# Patient Record
Sex: MALE | Race: BLACK OR AFRICAN AMERICAN | Employment: UNEMPLOYED | URBAN - METROPOLITAN AREA
[De-identification: names, ages, dates, MRNs, and addresses within clinical notes are randomized per-mention and may not be internally consistent; named-entity substitution may affect disease eponyms.]

---

## 2017-12-19 ENCOUNTER — HOSPITAL ENCOUNTER (OUTPATIENT)
Dept: GENERAL RADIOLOGY | Age: 32
Discharge: HOME | End: 2017-12-19
Payer: MEDICARE

## 2017-12-19 DIAGNOSIS — R52 PAIN: ICD-10-CM

## 2017-12-19 PROCEDURE — 73521 X-RAY EXAM HIPS BI 2 VIEWS: CPT

## 2017-12-19 PROCEDURE — 72100 X-RAY EXAM L-S SPINE 2/3 VWS: CPT

## 2017-12-27 ENCOUNTER — HOSPITAL ENCOUNTER (OUTPATIENT)
Dept: PHYSICAL THERAPY | Age: 32
Discharge: HOME | End: 2017-12-27
Payer: MEDICARE

## 2017-12-27 PROCEDURE — G8979 MOBILITY GOAL STATUS: HCPCS

## 2017-12-27 PROCEDURE — G8978 MOBILITY CURRENT STATUS: HCPCS

## 2017-12-27 PROCEDURE — 97110 THERAPEUTIC EXERCISES: CPT

## 2017-12-27 PROCEDURE — 97161 PT EVAL LOW COMPLEX 20 MIN: CPT
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
## 2018-01-02 ENCOUNTER — HOSPITAL ENCOUNTER (OUTPATIENT)
Dept: PHYSICAL THERAPY | Age: 33
Discharge: HOME | End: 2018-01-02
Payer: MEDICARE

## 2018-01-02 PROCEDURE — 97140 MANUAL THERAPY 1/> REGIONS: CPT | Performed by: PHYSICAL THERAPIST

## 2018-01-02 NOTE — PROGRESS NOTES
[facility]  [address], [address] [facility]    OUTPATIENT physical Therapy DAILY Treatment NOTe  Visit: 2    NAME: Alex Duarte AGE: 33 y.o. GENDER: male  DATE: 1/2/2018  REFERRING PHYSICIAN: Keturah Leavitt NP      GOALS  Short term goals  Time frame: 3 weeks  1. Patient will be compliant and independent with the initial HEP as evidenced by being able to perform without cueing. 2. Patient will report a 50% improvement in symptoms. 3. Patient report a 50% improvement in sleeping. 4. Patient will tolerate 15 minutes of clinic activities before onset of symptoms.      Long term goals  Time frame: 6 weeks  1. Patient will report pain level decrease to 2/10 to allow increased ease of movement. 2. Patient will have an improved score on the TXU Janki outcome measure by 10 points to demonstrate an increase in functional activity tolerance. 3. Patient will be independent in final individualized HEP. 6. Patient will return to School without being limited by symptoms. 7. Patient will sleep 6-8 hours without being interrupted by pain. SUBJECTIVE:   \"It's like this sharp, shooting pain down my leg. \"  Pain In: 10/10 in left hip/thigh/back/buttocks    OBJECTIVE DATA SUMMARY:   Objective data from initial evaluation:   EXAMINATION/PRESENTATION/DECISION MAKING:   Pain:  Location: Left hip and lat thigh and calf  Quality: aching, sharp and tingling  Now:  9/10  Factors that improve pain: rest, medication: Naprosyn used and beneficial     Posture:   Standing, Pronounced shift to right     Strength:   Left LE  Knee extension 4+/5  Knee Flexion 4/5     Range of Motion:   Trunk  Flexion, 25% of normal  Left SB 10%, painful  Right SB 25%   Extension 10%      Spinal Assessment:  Prominent shift to right     Joint Mobility:   Pt tolerated prone, Lumbar segments slightly hypomobile wih P-A pressure, pain L3-S1     Palpation:   TTP Left Gluts and piriformis,   Mild TTP along left lateral thigh     Neurologic Assessment:                         Tone: normal                         Sensation: WNL                         Reflexes: WNL, patellar reflex, slightly hyperactive on left     Special Tests:   + slump test B/L'ly  + SLR, B/L     Mobility:                         Transitional Movements: antalgic                          Gait: Antalgic with shift to right     Balance: WNL     Functional Measure:   Amando Marty:11/24     TREATMENT/INTERVENTION:  Modalities (Rationale): MHP post treatment to decrease pain and muscle guarding -held this date     Therapeutic Exercises:  HEP provided on evaluation: LTR, KTC, PPT, Angry Cat, Child's pose, prone on elbows    Self release to left piriformis with tennis ball: 30 seconds x 6  Slump neural glides LLE: 10 reps    Manual Therapy:  Long and short axis distraction LLE; pain relief  MET correct  Trigger point release to left piriformis, gluteals  Sciatic nerve glides LLE; supine  Piriformis stretch: 3 reps with 30 second holds  Lumbar gapping; right sidelying     Neuro Re-Education:  Discussed sitting with low back support    Activity tolerance and post treatment pain report:   Fair  Pain Out: 9/10    Education:  Education was provided to the patient on the following topics  [x]    No changes were made to the home exercise program.  []    The following changes were made to the home exercise program  Patient verbalized understanding of the topics presented. ASSESSMENT:   Patient returns following initial evaluation. Patient reports good adherence to HEP as able. Patient demonstrates continued pain in left low back, thigh, buttocks, hip which radiating to anterior tibialis. Patient describes pain as sharp and shooting this date. Patient very tender to palpation at left piriformis and gluteals. Appears to be some irritation from sciatic nerve. Patient with significant shift to right in standing; will require further evaluation next session.  Patient instructed on self neural glides in sitting and self release to left piriformis with tennis ball; instructed to avoid prolonged hold with tennis ball (30 seconds on single trigger point). Patient with pain relief following long and short axis distraction. Patients progression toward goals is as follows:  [x]     Improving appropriately and progressing toward goals  []     Improving slowly and progressing toward goals  []     Not making progress toward goals and plan of care will be adjusted    PLAN OF CARE:   Patient continues to benefit from skilled intervention to address the above impairments. [x]    Continue treatment per established plan of care.   []     Recommend the following changes to the plan of care    Recommendations/Intent for next treatment: further evaluate patient's shift to right     Jeovany Meyer, PT   Time Calculation: 30 mins  Patient Time in clinic:   Start Time: 1300   Stop Time: 1330

## 2018-01-04 ENCOUNTER — HOSPITAL ENCOUNTER (OUTPATIENT)
Dept: PHYSICAL THERAPY | Age: 33
Discharge: HOME | End: 2018-01-04
Payer: MEDICARE

## 2018-01-04 ENCOUNTER — APPOINTMENT (OUTPATIENT)
Dept: PHYSICAL THERAPY | Age: 33
End: 2018-01-04
Payer: MEDICARE

## 2018-01-04 PROCEDURE — 97140 MANUAL THERAPY 1/> REGIONS: CPT | Performed by: PHYSICAL THERAPIST

## 2018-01-04 PROCEDURE — G8980 MOBILITY D/C STATUS: HCPCS | Performed by: PHYSICAL THERAPIST

## 2018-01-04 PROCEDURE — G8979 MOBILITY GOAL STATUS: HCPCS | Performed by: PHYSICAL THERAPIST

## 2018-01-04 PROCEDURE — 97110 THERAPEUTIC EXERCISES: CPT | Performed by: PHYSICAL THERAPIST

## 2018-01-04 NOTE — PROGRESS NOTES
[facility]  [address], [address] [facility]    OUTPATIENT physical Therapy DAILY Treatment NOTe  Visit: 3    NAME: Michelle Lund AGE: 33 y.o. GENDER: male  DATE: 1/4/2018  REFERRING PHYSICIAN: Lori Joyce NP      GOALS  Short term goals  Time frame: 3 weeks  1. Patient will be compliant and independent with the initial HEP as evidenced by being able to perform without cueing. 2. Patient will report a 50% improvement in symptoms. 3. Patient report a 50% improvement in sleeping. 4. Patient will tolerate 15 minutes of clinic activities before onset of symptoms.      Long term goals  Time frame: 6 weeks  1. Patient will report pain level decrease to 2/10 to allow increased ease of movement. 2. Patient will have an improved score on the TXU Janki outcome measure by 10 points to demonstrate an increase in functional activity tolerance. 3. Patient will be independent in final individualized HEP. 6. Patient will return to School without being limited by symptoms. 7. Patient will sleep 6-8 hours without being interrupted by pain. SUBJECTIVE:   \"I'm able to walk better and stand up straight. \"    Pain In: 6/10 in left hip/thigh/back/buttocks    OBJECTIVE DATA SUMMARY:   Objective data from initial evaluation:   EXAMINATION/PRESENTATION/DECISION MAKING:   Pain:  Location: Left hip and lat thigh and calf  Quality: aching, sharp and tingling  Now:  9/10  Factors that improve pain: rest, medication: Naprosyn used and beneficial     Posture:   Standing, Pronounced shift to right     Strength:   Left LE  Knee extension 4+/5  Knee Flexion 4/5     Range of Motion:   Trunk  Flexion, 25% of normal  Left SB 10%, painful  Right SB 25%   Extension 10%      Spinal Assessment:  Prominent shift to right     Joint Mobility:   Pt tolerated prone, Lumbar segments slightly hypomobile wih P-A pressure, pain L3-S1     Palpation:   TTP Left Gluts and piriformis,   Mild TTP along left lateral thigh     Neurologic Assessment:                         Tone: normal                         Sensation: WNL                         Reflexes: WNL, patellar reflex, slightly hyperactive on left     Special Tests:   + slump test B/L'ly  + SLR, B/L     Mobility:                         Transitional Movements: antalgic                          Gait: Antalgic with shift to right     Balance: WNL     Functional Measure:   Amando Marty:11/24     TREATMENT/INTERVENTION:  Modalities (Rationale): MHP post treatment to decrease pain and muscle guarding -held this date     Therapeutic Exercises:  HEP provided on evaluation: LTR, KTC, PPT, Angry Cat, Child's pose, prone on elbows    Self release to left piriformis with tennis ball: 30 seconds x 6  Slump neural glides LLE: 10 reps  PPT: 5 reps  Bridges: 5 reps    Manual Therapy:  Long and short axis distraction LLE; pain relief  MET correct for left anterior innominate-held, equal  Trigger point release to left piriformis, gluteals  PA mob lumbar spine in prone, grade 2  Sacral PA mob in prone, grade 4  Sciatic nerve glides LLE; supine  Piriformis stretch: 3 reps with 30 second holds  Lumbar gapping in right sidelying; pain relief     Neuro Re-Education:  Discussed sitting with low back support    Activity tolerance and post treatment pain report:   Fair  Pain Out: 6/10    Education:  Education was provided to the patient on the following topics  [x]    No changes were made to the home exercise program.  []    The following changes were made to the home exercise program  Patient verbalized understanding of the topics presented. ASSESSMENT:   Patient reports good adherence to HEP. Patient demonstrates decreased pain since last session. Patient reports that he is walking better with less pain. Pain is still present in left low back, thigh, buttocks, and hip which radiates to anterior tibialis.  Patient less tender to palpation at left piriformis and gluteals this date. Good response to sciatic nerve glides and long and short axis distraction. Patient with significant shift to right in standing; no true leg length discrepancy noted. 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 Patient provided with heel insert for significant observable leg length difference to see if it will provide relief from some of the pain. Reassess response to treatment next session. Patients progression toward goals is as follows:  [x]     Improving appropriately and progressing toward goals  []     Improving slowly and progressing toward goals  []     Not making progress toward goals and plan of care will be adjusted    PLAN OF CARE:   Patient continues to benefit from skilled intervention to address the above impairments. [x]    Continue treatment per established plan of care.   []     Recommend the following changes to the plan of care    Recommendations/Intent for next treatment: reassess response to manual therapy    Namita Magaña, PT   Time Calculation: 60 mins  Patient Time in clinic:   Start Time: 1300   Stop Time: 1400

## 2018-01-09 ENCOUNTER — HOSPITAL ENCOUNTER (OUTPATIENT)
Dept: PHYSICAL THERAPY | Age: 33
Discharge: HOME | End: 2018-01-09
Payer: MEDICARE

## 2018-01-09 NOTE — PROGRESS NOTES
[facility]  [address], [address] [facility]    OUTPATIENT physical Therapy      1/9/2018:  Jose Currie was not seen on this date for physical therapy for the following reasons:    [x]     Patient called to cancel the visit for the following reasons: weather  []     Patient missed the visit and did not call to cancel.     Stephen Zabala, PT

## 2018-03-13 NOTE — PROGRESS NOTES
[facility]  [address], [address] [facility]    OUTPATIENT physical Therapy discharge note      3/13/2018:  Patient will be discharged from physical therapy at this time. Criteria for termination of care:    []           Patient has plateaued  [x]           Patient has not returned to therapy  []           Patient has missed three or more visits without prior notification  []           Other    Patient was seen for 3 visits from 12/27/17 to 1/4/18. Please refer to the most recent progress note for the latest PT info available. If you need anything further faxed to you, please contact us at [phone].     Thank you for this referral.  Madonna Hinton, PT

## 2018-03-13 NOTE — PROGRESS NOTES
Late Entry Physical Therapy Note  For discharge completed on 1/4/18    At the time of discharge G-codes were not filed. This was due to unanticipated discharge as patient did not return to therapy. The therapist who completed the last progress note was Tom Finley. The objective data available and documentation present has been reviewed. The case was discussed with the evaluating therapist, where available. The G-code and impairment levels have been determined based on therapist discretion because an objective measure was not available. In compliance with CMS's Claims Based Outcome Reporting, the following G-code set was chosen for this patient based on the primary functional limitation being treated. The patient's impairment level was determined based on the following:      ?  Mobility - Walking and Moving Around:     - CURRENT STATUS: CK - 40%-59% impaired, limited or restricted    - GOAL STATUS: CI - 1%-19% impaired, limited or restricted    - D/C STATUS:  CK - 40%-59% impaired, limited or restricted     Tom Finley, PT

## 2018-03-13 NOTE — PROGRESS NOTES
Late Entry Physical Therapy Note  For discharge completed on 1/4/18    At the time of discharge G-codes were not filed. This was due to unanticipated discharge as patient did not return to therapy. The therapist who completed the last progress note was Hang Tang. The objective data available and documentation present has been reviewed. The case was discussed with the evaluating therapist, where available. The G-code and impairment levels have been determined based on therapist discretion because an objective measure was not available. In compliance with CMS's Claims Based Outcome Reporting, the following G-code set was chosen for this patient based on the primary functional limitation being treated. The patient's impairment level was determined based on the following:      ?  Mobility - Walking and Moving Around:     - CURRENT STATUS: CK - 40%-59% impaired, limited or restricted    - GOAL STATUS: CI - 1%-19% impaired, limited or restricted    - D/C STATUS:  CK - 40%-59% impaired, limited or restricted     Dirk Running, PT

## 2018-03-13 NOTE — PROGRESS NOTES
[facility]  [address], [address] [facility]    OUTPATIENT physical Therapy discharge note      3/13/2018:  Patient will be discharged from physical therapy at this time. Criteria for termination of care:    []           Patient has plateaued  [x]           Patient has not returned to therapy  []           Patient has missed three or more visits without prior notification  []           Other    Patient was seen for 3 visits from 12/27/17 to 1/4/18. Please refer to the most recent progress note for the latest PT info available. If you need anything further faxed to you, please contact us at [phone].     Thank you for this referral.  Lobo Salinas, PT

## 2020-08-18 ENCOUNTER — HOSPITAL ENCOUNTER (OUTPATIENT)
Dept: GENERAL RADIOLOGY | Age: 35
Discharge: HOME | End: 2020-08-18
Payer: MEDICARE

## 2020-08-18 DIAGNOSIS — M54.2 NECK PAIN: ICD-10-CM

## 2020-08-18 PROCEDURE — 72050 X-RAY EXAM NECK SPINE 4/5VWS: CPT

## 2020-09-12 ENCOUNTER — HOSPITAL ENCOUNTER (EMERGENCY)
Age: 35
Discharge: HOME | End: 2020-09-12
Attending: EMERGENCY MEDICINE
Payer: MEDICARE

## 2020-09-12 VITALS
SYSTOLIC BLOOD PRESSURE: 108 MMHG | RESPIRATION RATE: 16 BRPM | OXYGEN SATURATION: 100 % | HEIGHT: 73 IN | WEIGHT: 167.55 LBS | BODY MASS INDEX: 22.21 KG/M2 | DIASTOLIC BLOOD PRESSURE: 76 MMHG | HEART RATE: 108 BPM | TEMPERATURE: 99.8 F

## 2020-09-12 DIAGNOSIS — R10.84 ABDOMINAL PAIN, GENERALIZED: Primary | ICD-10-CM

## 2020-09-12 DIAGNOSIS — R19.7 DIARRHEA, UNSPECIFIED TYPE: ICD-10-CM

## 2020-09-12 DIAGNOSIS — R11.2 NAUSEA AND VOMITING, INTRACTABILITY OF VOMITING NOT SPECIFIED, UNSPECIFIED VOMITING TYPE: ICD-10-CM

## 2020-09-12 LAB
ALBUMIN SERPL-MCNC: 4.3 G/DL (ref 3.5–5)
ALBUMIN/GLOB SERPL: 1.2 {RATIO} (ref 1.1–2.2)
ALP SERPL-CCNC: 54 U/L (ref 45–117)
ALT SERPL-CCNC: 64 U/L (ref 12–78)
ANION GAP SERPL CALC-SCNC: 4 MMOL/L (ref 5–15)
APPEARANCE UR: CLEAR
AST SERPL-CCNC: 48 U/L (ref 15–37)
BACTERIA URNS QL MICRO: NEGATIVE /HPF
BASOPHILS # BLD: 0 K/UL (ref 0–0.1)
BASOPHILS NFR BLD: 0 % (ref 0–1)
BILIRUB SERPL-MCNC: 0.9 MG/DL (ref 0.2–1)
BILIRUB UR QL CFM: NEGATIVE
BUN SERPL-MCNC: 9 MG/DL (ref 6–20)
BUN/CREAT SERPL: 7 (ref 12–20)
CALCIUM SERPL-MCNC: 9.5 MG/DL (ref 8.5–10.1)
CHLORIDE SERPL-SCNC: 104 MMOL/L (ref 97–108)
CO2 SERPL-SCNC: 30 MMOL/L (ref 21–32)
COLOR UR: ABNORMAL
CREAT SERPL-MCNC: 1.21 MG/DL (ref 0.7–1.3)
DIFFERENTIAL METHOD BLD: ABNORMAL
EOSINOPHIL # BLD: 0 K/UL (ref 0–0.4)
EOSINOPHIL NFR BLD: 0 % (ref 0–7)
EPITH CASTS URNS QL MICRO: ABNORMAL /LPF
ERYTHROCYTE [DISTWIDTH] IN BLOOD BY AUTOMATED COUNT: 13.2 % (ref 11.5–14.5)
GLOBULIN SER CALC-MCNC: 3.5 G/DL (ref 2–4)
GLUCOSE SERPL-MCNC: 84 MG/DL (ref 65–100)
GLUCOSE UR STRIP.AUTO-MCNC: NEGATIVE MG/DL
HCT VFR BLD AUTO: 44.6 % (ref 36.6–50.3)
HGB BLD-MCNC: 15.5 G/DL (ref 12.1–17)
HGB UR QL STRIP: NEGATIVE
IMM GRANULOCYTES # BLD AUTO: 0 K/UL (ref 0–0.04)
IMM GRANULOCYTES NFR BLD AUTO: 0 % (ref 0–0.5)
KETONES UR QL STRIP.AUTO: 40 MG/DL
LEUKOCYTE ESTERASE UR QL STRIP.AUTO: ABNORMAL
LIPASE SERPL-CCNC: 64 U/L (ref 73–393)
LYMPHOCYTES # BLD: 1.7 K/UL (ref 0.8–3.5)
LYMPHOCYTES NFR BLD: 34 % (ref 12–49)
MAGNESIUM SERPL-MCNC: 2.2 MG/DL (ref 1.6–2.4)
MCH RBC QN AUTO: 34.3 PG (ref 26–34)
MCHC RBC AUTO-ENTMCNC: 34.8 G/DL (ref 30–36.5)
MCV RBC AUTO: 98.7 FL (ref 80–99)
MONOCYTES # BLD: 1.1 K/UL (ref 0–1)
MONOCYTES NFR BLD: 22 % (ref 5–13)
MUCOUS THREADS URNS QL MICRO: ABNORMAL /LPF
NEUTS SEG # BLD: 2.3 K/UL (ref 1.8–8)
NEUTS SEG NFR BLD: 44 % (ref 32–75)
NITRITE UR QL STRIP.AUTO: NEGATIVE
NRBC # BLD: 0 K/UL (ref 0–0.01)
NRBC BLD-RTO: 0 PER 100 WBC
PH UR STRIP: 6.5 [PH] (ref 5–8)
PLATELET # BLD AUTO: 164 K/UL (ref 150–400)
PMV BLD AUTO: 9.6 FL (ref 8.9–12.9)
POTASSIUM SERPL-SCNC: 3.5 MMOL/L (ref 3.5–5.1)
PROT SERPL-MCNC: 7.8 G/DL (ref 6.4–8.2)
PROT UR STRIP-MCNC: 30 MG/DL
RBC # BLD AUTO: 4.52 M/UL (ref 4.1–5.7)
RBC #/AREA URNS HPF: ABNORMAL /HPF (ref 0–5)
RBC MORPH BLD: ABNORMAL
SODIUM SERPL-SCNC: 138 MMOL/L (ref 136–145)
SP GR UR REFRACTOMETRY: 1.02 (ref 1–1.03)
UA: UC IF INDICATED,UAUC: ABNORMAL
UROBILINOGEN UR QL STRIP.AUTO: 1 EU/DL (ref 0.2–1)
WBC # BLD AUTO: 5.1 K/UL (ref 4.1–11.1)
WBC URNS QL MICRO: ABNORMAL /HPF (ref 0–4)

## 2020-09-12 PROCEDURE — 99283 EMERGENCY DEPT VISIT LOW MDM: CPT

## 2020-09-12 PROCEDURE — 80053 COMPREHEN METABOLIC PANEL: CPT

## 2020-09-12 PROCEDURE — 85025 COMPLETE CBC W/AUTO DIFF WBC: CPT

## 2020-09-12 PROCEDURE — 87635 SARS-COV-2 COVID-19 AMP PRB: CPT

## 2020-09-12 PROCEDURE — 81001 URINALYSIS AUTO W/SCOPE: CPT

## 2020-09-12 PROCEDURE — 96372 THER/PROPH/DIAG INJ SC/IM: CPT

## 2020-09-12 PROCEDURE — 36415 COLL VENOUS BLD VENIPUNCTURE: CPT

## 2020-09-12 PROCEDURE — 83690 ASSAY OF LIPASE: CPT

## 2020-09-12 PROCEDURE — 74011250636 HC RX REV CODE- 250/636: Performed by: EMERGENCY MEDICINE

## 2020-09-12 PROCEDURE — 87491 CHLMYD TRACH DNA AMP PROBE: CPT

## 2020-09-12 PROCEDURE — 83735 ASSAY OF MAGNESIUM: CPT

## 2020-09-12 PROCEDURE — 74011250637 HC RX REV CODE- 250/637: Performed by: EMERGENCY MEDICINE

## 2020-09-12 PROCEDURE — 74011000250 HC RX REV CODE- 250: Performed by: EMERGENCY MEDICINE

## 2020-09-12 RX ORDER — ONDANSETRON 4 MG/1
4 TABLET, ORALLY DISINTEGRATING ORAL
Status: COMPLETED | OUTPATIENT
Start: 2020-09-12 | End: 2020-09-12

## 2020-09-12 RX ORDER — AZITHROMYCIN 250 MG/1
500 TABLET, FILM COATED ORAL
Status: COMPLETED | OUTPATIENT
Start: 2020-09-12 | End: 2020-09-12

## 2020-09-12 RX ORDER — ONDANSETRON 4 MG/1
4 TABLET, ORALLY DISINTEGRATING ORAL
Qty: 10 TAB | Refills: 0 | Status: SHIPPED | OUTPATIENT
Start: 2020-09-12

## 2020-09-12 RX ADMIN — LIDOCAINE HYDROCHLORIDE 250 MG: 10 INJECTION, SOLUTION EPIDURAL; INFILTRATION; INTRACAUDAL; PERINEURAL at 15:22

## 2020-09-12 RX ADMIN — ONDANSETRON 4 MG: 4 TABLET, ORALLY DISINTEGRATING ORAL at 15:23

## 2020-09-12 RX ADMIN — AZITHROMYCIN 500 MG: 250 TABLET, FILM COATED ORAL at 15:22

## 2020-09-12 NOTE — DISCHARGE INSTRUCTIONS
Patient Education        Diarrhea: Care Instructions  Your Care Instructions     Diarrhea is loose, watery stools (bowel movements). The exact cause is often hard to find. Sometimes diarrhea is your body's way of getting rid of what caused an upset stomach. Viruses, food poisoning, and many medicines can cause diarrhea. Some people get diarrhea in response to emotional stress, anxiety, or certain foods. Almost everyone has diarrhea now and then. It usually isn't serious, and your stools will return to normal soon. The important thing to do is replace the fluids you have lost, so you can prevent dehydration. The doctor has checked you carefully, but problems can develop later. If you notice any problems or new symptoms, get medical treatment right away. Follow-up care is a key part of your treatment and safety. Be sure to make and go to all appointments, and call your doctor if you are having problems. It's also a good idea to know your test results and keep a list of the medicines you take. How can you care for yourself at home? · Watch for signs of dehydration, which means your body has lost too much water. Dehydration is a serious condition and should be treated right away. Signs of dehydration are:  ? Increasing thirst and dry eyes and mouth. ? Feeling faint or lightheaded. ? A smaller amount of urine than normal.  · To prevent dehydration, drink plenty of fluids. Choose water and other caffeine-free clear liquids until you feel better. If you have kidney, heart, or liver disease and have to limit fluids, talk with your doctor before you increase the amount of fluids you drink. · Begin eating small amounts of mild foods the next day, if you feel like it. ? Try yogurt that has live cultures of Lactobacillus. (Check the label.)  ? Avoid spicy foods, fruits, alcohol, and caffeine until 48 hours after all symptoms are gone. ? Avoid chewing gum that contains sorbitol. ?  Avoid dairy products (except for yogurt with Lactobacillus) while you have diarrhea and for 3 days after symptoms are gone. · The doctor may recommend that you take over-the-counter medicine, such as loperamide (Imodium), if you still have diarrhea after 6 hours. Read and follow all instructions on the label. Do not use this medicine if you have bloody diarrhea, a high fever, or other signs of serious illness. Call your doctor if you think you are having a problem with your medicine. When should you call for help? Call 911 anytime you think you may need emergency care. For example, call if:    · You passed out (lost consciousness).     · Your stools are maroon or very bloody. Call your doctor now or seek immediate medical care if:    · You are dizzy or lightheaded, or you feel like you may faint.     · Your stools are black and look like tar, or they have streaks of blood.     · You have new or worse belly pain.     · You have symptoms of dehydration, such as:  ? Dry eyes and a dry mouth. ? Passing only a little dark urine. ? Feeling thirstier than usual.     · You have a new or higher fever. Watch closely for changes in your health, and be sure to contact your doctor if:    · Your diarrhea is getting worse.     · You see pus in the diarrhea.     · You are not getting better after 2 days (48 hours). Where can you learn more? Go to http://tl-efe.info/  Enter [id number] in the search box to learn more about \"Diarrhea: Care Instructions. \"  Current as of: June 26, 2019               Content Version: 12.6  © 5771-1791 [employer], Incorporated. Care instructions adapted under license by [employer] (which disclaims liability or warranty for this information). If you have questions about a medical condition or this instruction, always ask your healthcare professional. [place] [address] any warranty or liability for your use of this information.          Patient Education        Abdominal Pain: Care Instructions  Your Care Instructions     Abdominal pain has many possible causes. Some aren't serious and get better on their own in a few days. Others need more testing and treatment. If your pain continues or gets worse, you need to be rechecked and may need more tests to find out what is wrong. You may need surgery to correct the problem. Don't ignore new symptoms, such as fever, nausea and vomiting, urination problems, pain that gets worse, and dizziness. These may be signs of a more serious problem. Your doctor may have recommended a follow-up visit in the next 8 to 12 hours. If you are not getting better, you may need more tests or treatment. The doctor has checked you carefully, but problems can develop later. If you notice any problems or new symptoms, get medical treatment right away. Follow-up care is a key part of your treatment and safety. Be sure to make and go to all appointments, and call your doctor if you are having problems. It's also a good idea to know your test results and keep a list of the medicines you take. How can you care for yourself at home? · Rest until you feel better. · To prevent dehydration, drink plenty of fluids, enough so that your urine is light yellow or clear like water. Choose water and other caffeine-free clear liquids until you feel better. If you have kidney, heart, or liver disease and have to limit fluids, talk with your doctor before you increase the amount of fluids you drink. · If your stomach is upset, eat mild foods, such as rice, dry toast or crackers, bananas, and applesauce. Try eating several small meals instead of two or three large ones. · Wait until 48 hours after all symptoms have gone away before you have spicy foods, alcohol, and drinks that contain caffeine. · Do not eat foods that are high in fat. · Avoid anti-inflammatory medicines such as aspirin, ibuprofen (Advil, Motrin), and naproxen (Aleve). These can cause stomach upset.  Talk to your doctor if you take daily aspirin for another health problem. When should you call for help? Call 911 anytime you think you may need emergency care. For example, call if:    · You passed out (lost consciousness).     · You pass maroon or very bloody stools.     · You vomit blood or what looks like coffee grounds.     · You have new, severe belly pain. Call your doctor now or seek immediate medical care if:    · Your pain gets worse, especially if it becomes focused in one area of your belly.     · You have a new or higher fever.     · Your stools are black and look like tar, or they have streaks of blood.     · You have unexpected vaginal bleeding.     · You have symptoms of a urinary tract infection. These may include:  ? Pain when you urinate. ? Urinating more often than usual.  ? Blood in your urine.     · You are dizzy or lightheaded, or you feel like you may faint. Watch closely for changes in your health, and be sure to contact your doctor if:    · You are not getting better after 1 day (24 hours). Where can you learn more? Go to http://tlEncirq Corporationefe.info/  Enter Z246 in the search box to learn more about \"Abdominal Pain: Care Instructions. \"  Current as of: June 26, 2019               Content Version: 12.6  © 7421-2717 [employer]. Care instructions adapted under license by [employer] (which disclaims liability or warranty for this information). If you have questions about a medical condition or this instruction, always ask your healthcare professional. [place] [address] any warranty or liability for your use of this information. Patient Education        Nausea and Vomiting: Care Instructions  Your Care Instructions     When you are nauseated, you may feel weak and sweaty and notice a lot of saliva in your mouth. Nausea often leads to vomiting.  Most of the time you do not need to worry about nausea and vomiting, but they can be signs of other illnesses. Two common causes of nausea and vomiting are stomach flu and food poisoning. Nausea and vomiting from viral stomach flu will usually start to improve within 24 hours. Nausea and vomiting from food poisoning may last from 12 to 48 hours. The doctor has checked you carefully, but problems can develop later. If you notice any problems or new symptoms, get medical treatment right away. Follow-up care is a key part of your treatment and safety. Be sure to make and go to all appointments, and call your doctor if you are having problems. It's also a good idea to know your test results and keep a list of the medicines you take. How can you care for yourself at home? · To prevent dehydration, drink plenty of fluids, enough so that your urine is light yellow or clear like water. Choose water and other caffeine-free clear liquids until you feel better. If you have kidney, heart, or liver disease and have to limit fluids, talk with your doctor before you increase the amount of fluids you drink. · Rest in bed until you feel better. · When you are able to eat, try clear soups, mild foods, and liquids until all symptoms are gone for 12 to 48 hours. Other good choices include dry toast, crackers, cooked cereal, and gelatin dessert, such as Jell-O. When should you call for help? Call 911 anytime you think you may need emergency care. For example, call if:    · You passed out (lost consciousness). Call your doctor now or seek immediate medical care if:    · You have symptoms of dehydration, such as:  ? Dry eyes and a dry mouth. ? Passing only a little dark urine. ? Feeling thirstier than usual.     · You have new or worsening belly pain.     · You have a new or higher fever.     · You vomit blood or what looks like coffee grounds. Watch closely for changes in your health, and be sure to contact your doctor if:    · You have ongoing nausea and vomiting.     · Your vomiting is getting worse.   · Your vomiting lasts longer than 2 days.     · You are not getting better as expected. Where can you learn more? Go to http://www.Emergency CallWorks.com/  Enter H591 in the search box to learn more about \"Nausea and Vomiting: Care Instructions. \"  Current as of: June 26, 2019               Content Version: 12.6  © 1037-6274 [employer]. Care instructions adapted under license by [employer] (which disclaims liability or warranty for this information). If you have questions about a medical condition or this instruction, always ask your healthcare professional. [place] [address] any warranty or liability for your use of this information.

## 2020-09-12 NOTE — ED PROVIDER NOTES
EMERGENCY DEPARTMENT HISTORY AND PHYSICAL EXAM      Date: 9/12/2020  Patient Name: Collin Calixto    History of Presenting Illness     Chief Complaint   Patient presents with    Nausea    Vomiting    Diarrhea     times four days    Sexually Transmitted Disease     requesting STD check       History Provided By: Patient    HPI: Collin Calixto, 30 y.o. male with PMHx as noted below presents the emergency department with complaints of nausea, vomiting, diarrhea as well as requesting STD check. Patient notes that he was recently in [place] where he was sexually active with no protection was concerned about STIs. Patient also reports developing a, nausea, vomiting, diarrhea approximately 4 days ago after returning from [place]. Patient notes the symptoms have persisted and are associated with diffuse, dull crampy abdominal pain that is intermittent and does not radiate. No relieving or exacerbating factors. Patient reports loose, nonbloody stool and there is been no blood in the emesis. Patient does report history of HIV but notes that he had labs done 1 month ago with an undetectable viral load and normal CD4 count. Patient denies any recent foreign travel, hospitalization, antibiotic usage. PCP: Kraig Young NP    Current Facility-Administered Medications   Medication Dose Route Frequency Provider Last Rate Last Dose    ondansetron (ZOFRAN ODT) tablet 4 mg  4 mg Oral NOW Jun Stovall MD        cefTRIAXone (ROCEPHIN) 250 mg in lidocaine (PF) (XYLOCAINE) 10 mg/mL (1 %) IM injection  250 mg IntraMUSCular Mo Felton MD        [facility]) tablet 500 mg  500 mg Oral NOW Jun Stovall MD         Current Outpatient Medications   Medication Sig Dispense Refill    ondansetron (Zofran ODT) 4 mg disintegrating tablet Take 1 Tab by mouth every eight (8) hours as needed for Nausea.  10 Tab 0       Past History     Past Medical History:  HIV      Social History:  Denies heavy alcohol or illicit drugs      Allergies:  No Known Allergies      Review of Systems   Review of Systems  Constitutional: Negative for fever, chills, and fatigue. HENT: Negative for congestion, sore throat, rhinorrhea, sneezing and neck stiffness   Eyes: Negative for discharge and redness. Respiratory: Negative for  shortness of breath, wheezing   Cardiovascular: Negative for chest pain, palpitations   Gastrointestinal: Positive for nausea, vomiting, abdominal pain, , diarrhea. Negative blood in stool. Genitourinary: Negative for dysuria, hematuria, flank pain, decreased urine volume, discharge,   Musculoskeletal: Negative for myalgias or joint pain . Skin: Negative for rash or lesions . Neurological: Negative weakness, light-headedness, numbness and headaches. Physical Exam   Physical Exam    GENERAL: alert and oriented, no acute distress  EYES: PEERL, No injection, discharge or icterus. ENT: Mucous membranes pink and moist.  NECK: Supple  LUNGS: Airway patent. Non-labored respirations. Breath sounds clear with good air entry bilaterally. HEART: Regular rate and rhythm. No peripheral edema  ABDOMEN: Non-distended and non-tender, without guarding or rebound.   SKIN:  warm, dry  MSK/EXTREMITIES: Without swelling, tenderness or deformity, symmetric with normal ROM  NEUROLOGICAL: Alert, oriented      Diagnostic Study Results     Labs -     Recent Results (from the past 12 hour(s))   CBC WITH AUTOMATED DIFF    Collection Time: 09/12/20  2:31 PM   Result Value Ref Range    WBC 5.1 4.1 - 11.1 K/uL    RBC 4.52 4.10 - 5.70 M/uL    HGB 15.5 12.1 - 17.0 g/dL    HCT 44.6 36.6 - 50.3 %    MCV 98.7 80.0 - 99.0 FL    MCH 34.3 (H) 26.0 - 34.0 PG    MCHC 34.8 30.0 - 36.5 g/dL    RDW 13.2 11.5 - 14.5 %    PLATELET 189 161 - 713 K/uL    MPV 9.6 8.9 - 12.9 FL    NRBC 0.0 0  WBC    ABSOLUTE NRBC 0.00 0.00 - 0.01 K/uL    NEUTROPHILS PENDING %    LYMPHOCYTES PENDING %    MONOCYTES PENDING %    EOSINOPHILS PENDING %    BASOPHILS PENDING %    IMMATURE GRANULOCYTES PENDING %    ABS. NEUTROPHILS PENDING K/UL    ABS. LYMPHOCYTES PENDING K/UL    ABS. MONOCYTES PENDING K/UL    ABS. EOSINOPHILS PENDING K/UL    ABS. BASOPHILS PENDING K/UL    ABS. IMM. GRANS. PENDING K/UL    DF PENDING        Radiologic Studies -   No orders to display     CT Results  (Last 48 hours)    None        CXR Results  (Last 48 hours)    None            Medical Decision Making   IDavid MD am the first provider for this patient and am the attending of record for this patient encounter. I reviewed the vital signs, available nursing notes, past medical history, past surgical history, family history and social history. Vital Signs-Reviewed the patient's vital signs. Patient Vitals for the past 12 hrs:   Temp Pulse Resp BP SpO2   09/12/20 1241 99.8 °F (37.7 °C) (!) 108 16 108/76 100 %       Records Reviewed: Nursing Notes and Old Medical Records    Provider Notes (Medical Decision Making):   Patient presents to the emergency room for abd pain, vomiting and diarrhea. On presentation, the patient is well appearing, in no acute distress with normal vital signs (was not tachycardic on my evaluation). Differential diagnosis considered includes gastroenteritis, gastritis, enteritis, inflammatory bowel disease, bowel obstruction, pancreatitis, cholecystitis, appendicitis, c.diff colitis, travelers diarrhea, diverticulitis, COVID-19   or a food-borne illness, opportunistic infection. Abdominal examination was soft and benign so no concern for any acute surgical issues at presentation. Clinical history, examination, and work-up are consistent with a likely self-limited gastrointestinal illness. Patient was provided anti-emetics with overall improvement in symptoms. The patient was able to tolerate oral intake prior to discharge. Stools were reportedly non-bloody and no antibiotics were felt to be warranted at this time.   Patient is encouraged to maintain hydration. A prescription for anti-emetics was provided. CT scan was not felt to be warranted at this time as his abdomen remained benign on repeat. .  Patient should return for severe pain, intractable vomiting, fevers, bloody stools, or dehydration. The patient understood the diagnosis and treatment and had no further questions. Patient should call a primary care physician for follow-up, and was otherwise deemed stable for discharge to home. ED Course:   Initial assessment performed. The patients presenting problems have been discussed, and they are in agreement with the care plan formulated and outlined with them. I have encouraged them to ask questions as they arise throughout their visit. PROGRESS  Silvio Beatty's  results have been reviewed with him. He has been counseled regarding his diagnosis. He verbally conveys understanding and agreement of the signs, symptoms, diagnosis, treatment and prognosis and additionally agrees to follow up as recommended with Dr. Stacey Novoa NP in 24 - 48 hours. He also agrees with the care-plan and conveys that all of his questions have been answered. I have also put together some discharge instructions for him that include: 1) educational information regarding their diagnosis, 2) how to care for their diagnosis at home, as well a 3) list of reasons why they would want to return to the ED prior to their follow-up appointment, should their condition change. Disposition:  home    PLAN:  1. Current Discharge Medication List      START taking these medications    Details   ondansetron (Zofran ODT) 4 mg disintegrating tablet Take 1 Tab by mouth every eight (8) hours as needed for Nausea. Qty: 10 Tab, Refills: 0           2.    Follow-up Information     Follow up With Specialties Details Why Contact Arcadio Novoa NP Nurse Practitioner Schedule an appointment as soon as possible for a visit in 2 days  [phone]  [phone]          Return to ED if worse     Diagnosis     Clinical Impression:   1. Abdominal pain, generalized    2. Nausea and vomiting, intractability of vomiting not specified, unspecified vomiting type    3. Diarrhea, unspecified type        Please note that this dictation was completed with Dragon, computer voice recognition software. Quite often unanticipated grammatical, syntax, homophones, and other interpretive errors are inadvertently transcribed by the computer software. Please disregard these errors. Additionally, please excuse any errors that have escaped final proofreading.

## 2020-09-14 ENCOUNTER — PATIENT OUTREACH (OUTPATIENT)
Dept: CASE MANAGEMENT | Age: 35
End: 2020-09-14

## 2020-09-14 LAB
C TRACH DNA SPEC QL NAA+PROBE: NEGATIVE
COVID-19, XGCOVT: DETECTED
HEALTH STATUS, XMCV2T: ABNORMAL
N GONORRHOEA DNA SPEC QL NAA+PROBE: NEGATIVE
SAMPLE TYPE: NORMAL
SERVICE CMNT-IMP: NORMAL
SOURCE, COVRS: ABNORMAL
SPECIMEN SOURCE, FCOV2M: ABNORMAL
SPECIMEN SOURCE: NORMAL
SPECIMEN TYPE, XMCV1T: ABNORMAL

## 2020-09-14 NOTE — PROGRESS NOTES
Patient contacted regarding COVID-19 diagnosis. Discussed COVID-19 related testing which was available at this time. Test results were positive. Patient informed of results, if available? yes     Care Transition Nurse/ Ambulatory Care Manager/ LPN Care Coordinator contacted the patient by telephone to perform post discharge assessment. Verified name and  with patient as identifiers. Provided introduction to self, and explanation of the CTN/ACM/LPN role, and reason for call due to risk factors for infection and/or exposure to COVID-19. Symptoms reviewed with patient who verbalized the following symptoms: no new symptoms and no worsening symptoms. Due to no new or worsening symptoms encounter was not routed to provider for escalation. Discussed follow-up appointments. If no appointment was previously scheduled, appointment scheduling offered: yes  [facility] follow up appointment(s): No future appointments. Non-[facility] follow up appointment(s): pt states no new or worsening symptoms. Pt states he will call his primary care, if needed. Advance Care Planning:   Does patient have an Advance Directive: currently not on file; education provided     Patient has following risk factors of: immunocompromised. CTN/ACM/LPN reviewed discharge instructions, medical action plan and red flags such as increased shortness of breath, increasing fever and signs of decompensation with patient who verbalized understanding. Discussed exposure protocols and quarantine with CDC Guidelines What to do if you are sick with coronavirus disease .  Patient was given an opportunity for questions and concerns. The patient agrees to contact the [facility] exposure line [phone], [facility]  ([phone]) and PCP office for questions related to their healthcare. CTN/ACM provided contact information for future needs.     Reviewed and educated patient on any new and changed medications related to discharge diagnosis. Patient/family/caregiver given information for [employer] and agrees to enroll yes  Patient's preferred e-mail:  [email or web address]  Patient's preferred phone number: [phone]  Based on Loop alert triggers, patient will be contacted by nurse care manager for worsening symptoms. Pt will be further monitored by COVID Loop Team based on severity of symptoms and risk factors.

## 2020-09-21 ENCOUNTER — PATIENT OUTREACH (OUTPATIENT)
Dept: CASE MANAGEMENT | Age: 35
End: 2020-09-21

## 2020-09-21 NOTE — PROGRESS NOTES
Patient contacted regarding COVID-19 risk and screening. Discussed COVID-19 related testing which was available at this time. Test results were positive. Patient informed of results, if available? yes     Care Transition Nurse/ Ambulatory Care Manager/ LPN Care Coordinator was not able to contact the patient by telephone to perform follow-up assessment.

## 2020-09-28 ENCOUNTER — PATIENT OUTREACH (OUTPATIENT)
Dept: CASE MANAGEMENT | Age: 35
End: 2020-09-28

## 2020-09-28 NOTE — PROGRESS NOTES
Patient resolved from Transition of Care episode on 9/28/20. Discussed COVID-19 related testing which was available at this time. Test results were positive. Patient informed of results, if available? yes     Patient/family has been provided the following resources and education related to COVID-19:                         Signs, symptoms and red flags related to COVID-19            [facility] exposure and quarantine guidelines            [facility] exposure contact - [phone]            Contact for their local Department of Health               Patient currently reports that the following symptoms have improved:  no new symptoms and no worsening symptoms. No further outreach scheduled with this CTN/ACM/LPN/HC/ MA. Episode of Care resolved. Patient has this CTN/ACM/LPN/HC/MA contact information if future needs arise.

## 2021-08-15 ENCOUNTER — TRANSCRIBE ORDER (OUTPATIENT)
Dept: SCHEDULING | Age: 36
End: 2021-08-15

## 2021-08-15 DIAGNOSIS — L02.32 BOIL OF BUTTOCK: Primary | ICD-10-CM

## 2022-01-20 ENCOUNTER — HOSPITAL ENCOUNTER (OUTPATIENT)
Dept: GENERAL RADIOLOGY | Age: 37
Discharge: HOME | End: 2022-01-20
Attending: NURSE PRACTITIONER
Payer: MEDICARE

## 2022-01-20 ENCOUNTER — TRANSCRIBE ORDER (OUTPATIENT)
Dept: REGISTRATION | Age: 37
End: 2022-01-20

## 2022-01-20 DIAGNOSIS — J45.20 MILD INTERMITTENT ASTHMA: Primary | ICD-10-CM

## 2022-01-20 DIAGNOSIS — J45.20 MILD INTERMITTENT ASTHMA: ICD-10-CM

## 2022-01-20 PROCEDURE — 71046 X-RAY EXAM CHEST 2 VIEWS: CPT

## 2022-11-04 ENCOUNTER — HOSPITAL ENCOUNTER (OUTPATIENT)
Dept: GENERAL RADIOLOGY | Age: 37
Discharge: HOME | End: 2022-11-04
Payer: MEDICARE

## 2022-11-04 ENCOUNTER — TRANSCRIBE ORDER (OUTPATIENT)
Dept: REGISTRATION | Age: 37
End: 2022-11-04

## 2022-11-04 DIAGNOSIS — M54.2 NECK PAIN: Primary | ICD-10-CM

## 2022-11-04 DIAGNOSIS — M25.511 ACUTE PAIN OF RIGHT SHOULDER: ICD-10-CM

## 2022-11-04 DIAGNOSIS — M54.2 NECK PAIN: ICD-10-CM

## 2022-11-04 PROCEDURE — 72050 X-RAY EXAM NECK SPINE 4/5VWS: CPT

## 2022-11-04 PROCEDURE — 73030 X-RAY EXAM OF SHOULDER: CPT
